# Patient Record
Sex: FEMALE | URBAN - NONMETROPOLITAN AREA
[De-identification: names, ages, dates, MRNs, and addresses within clinical notes are randomized per-mention and may not be internally consistent; named-entity substitution may affect disease eponyms.]

---

## 2024-03-07 ENCOUNTER — APPOINTMENT (OUTPATIENT)
Dept: URBAN - NONMETROPOLITAN AREA CLINIC 54 | Age: 84
Setting detail: DERMATOLOGY
End: 2024-03-07

## 2024-03-07 DIAGNOSIS — L259 CONTACT DERMATITIS AND OTHER ECZEMA, UNSPECIFIED CAUSE: ICD-10-CM

## 2024-03-07 PROBLEM — L23.9 ALLERGIC CONTACT DERMATITIS, UNSPECIFIED CAUSE: Status: ACTIVE | Noted: 2024-03-07

## 2024-03-07 PROCEDURE — 99203 OFFICE O/P NEW LOW 30 MIN: CPT

## 2024-03-07 PROCEDURE — OTHER COUNSELING: OTHER

## 2024-03-07 PROCEDURE — OTHER PRESCRIPTION MEDICATION MANAGEMENT: OTHER

## 2024-03-07 PROCEDURE — OTHER PRESCRIPTION: OTHER

## 2024-03-07 PROCEDURE — OTHER ADDITIONAL NOTES: OTHER

## 2024-03-07 RX ORDER — PREDNISOLONE ACETATE 10 MG/ML
SUSPENSION/ DROPS OPHTHALMIC
Qty: 15 | Refills: 0 | Status: ERX | COMMUNITY
Start: 2024-03-07

## 2024-03-07 NOTE — PROCEDURE: ADDITIONAL NOTES
Detail Level: Simple
Render Risk Assessment In Note?: no
Additional Notes: Patient has rare eye condition and is cautious about using things around her eyes. She has been working outside picking up sticks so she might have got something around her eye without realizing it. She denies patches elsewhere. The area is improving slightly. Discussed use of steroid eyedrop and patient agreeable. Will recheck in 3 weeks.

## 2024-03-07 NOTE — HPI: SKIN LESION
What Type Of Note Output Would You Prefer (Optional)?: Bullet Format
Has Your Skin Lesion Been Treated?: not been treated
Is This A New Presentation, Or A Follow-Up?: Skin Lesions
Additional History: Pt has red scaly painful lesion on and near left eyelid. Has used neosporin and Vaseline both on it. Not as scaly today. Does use vanicream moisturizer but has not used it on this lesion.

## 2024-03-07 NOTE — PROCEDURE: PRESCRIPTION MEDICATION MANAGEMENT
Detail Level: Zone
Initiate Treatment: prednisolone acetate 1 % eye drops,suspension \\nQuantity: 15.0 ml  Days Supply: 30\\nSig: Apply to the eyelid twice daily for one week then once daily one week then stop
Render In Strict Bullet Format?: No

## 2024-03-28 ENCOUNTER — APPOINTMENT (OUTPATIENT)
Dept: URBAN - NONMETROPOLITAN AREA CLINIC 54 | Age: 84
Setting detail: DERMATOLOGY
End: 2024-03-28

## 2024-03-28 DIAGNOSIS — L259 CONTACT DERMATITIS AND OTHER ECZEMA, UNSPECIFIED CAUSE: ICD-10-CM

## 2024-03-28 PROBLEM — L23.9 ALLERGIC CONTACT DERMATITIS, UNSPECIFIED CAUSE: Status: ACTIVE | Noted: 2024-03-28

## 2024-03-28 PROCEDURE — OTHER ADDITIONAL NOTES: OTHER

## 2024-03-28 PROCEDURE — OTHER COUNSELING: OTHER

## 2024-03-28 PROCEDURE — OTHER PRESCRIPTION MEDICATION MANAGEMENT: OTHER

## 2024-03-28 PROCEDURE — 99213 OFFICE O/P EST LOW 20 MIN: CPT

## 2024-03-28 NOTE — PROCEDURE: ADDITIONAL NOTES
Render Risk Assessment In Note?: no
Additional Notes: Patient has been doing well. Dermatitis has almost fully resolved. One area remains beside her L eye. She is now using Vanicream to this area. Discussed resuming topical steroid again x 7 days (BID x 3 days, QD x 4 days to the remaining patch. Patient is to call if areas return.
Detail Level: Simple

## 2024-03-28 NOTE — PROCEDURE: PRESCRIPTION MEDICATION MANAGEMENT
Continue Regimen: Vanicream\\n\\nprednisolone acetate 1 % eye drops,suspension \\nQuantity: 15.0 ml  Days Supply: 30\\nSig: Apply to the eyelid twice daily for one week then once daily one week then stop
Detail Level: Zone
Render In Strict Bullet Format?: No